# Patient Record
Sex: FEMALE | Race: WHITE | NOT HISPANIC OR LATINO | Employment: OTHER | URBAN - METROPOLITAN AREA
[De-identification: names, ages, dates, MRNs, and addresses within clinical notes are randomized per-mention and may not be internally consistent; named-entity substitution may affect disease eponyms.]

---

## 2018-08-29 ENCOUNTER — EVALUATION (OUTPATIENT)
Dept: PHYSICAL THERAPY | Facility: CLINIC | Age: 43
End: 2018-08-29
Payer: COMMERCIAL

## 2018-08-29 ENCOUNTER — TRANSCRIBE ORDERS (OUTPATIENT)
Dept: PHYSICAL THERAPY | Facility: CLINIC | Age: 43
End: 2018-08-29

## 2018-08-29 DIAGNOSIS — S83.232S COMPLEX TEAR OF MEDIAL MENISCUS OF LEFT KNEE AS CURRENT INJURY, SEQUELA: Primary | ICD-10-CM

## 2018-08-29 DIAGNOSIS — S83.232A COMPLEX TEAR OF MEDIAL MENISCUS OF LEFT KNEE AS CURRENT INJURY, INITIAL ENCOUNTER: Primary | ICD-10-CM

## 2018-08-29 PROCEDURE — 97161 PT EVAL LOW COMPLEX 20 MIN: CPT | Performed by: PHYSICAL THERAPIST

## 2018-08-29 PROCEDURE — 97014 ELECTRIC STIMULATION THERAPY: CPT | Performed by: PHYSICAL THERAPIST

## 2018-08-29 PROCEDURE — G0283 ELEC STIM OTHER THAN WOUND: HCPCS | Performed by: PHYSICAL THERAPIST

## 2018-08-29 PROCEDURE — G8979 MOBILITY GOAL STATUS: HCPCS | Performed by: PHYSICAL THERAPIST

## 2018-08-29 PROCEDURE — G8978 MOBILITY CURRENT STATUS: HCPCS | Performed by: PHYSICAL THERAPIST

## 2018-08-29 PROCEDURE — 97140 MANUAL THERAPY 1/> REGIONS: CPT | Performed by: PHYSICAL THERAPIST

## 2018-08-29 PROCEDURE — 97110 THERAPEUTIC EXERCISES: CPT | Performed by: PHYSICAL THERAPIST

## 2018-08-29 NOTE — LETTER
2018    MD Ajay Jeff [address], [facility]    Patient: Karen Walker   YOB: 1975   Date of Visit: 2018     Encounter Diagnosis     ICD-10-CM    1  Complex tear of medial meniscus of left knee as current injury, initial encounter S83 232A        Dear Dr Sylvester Median:    Please review the attached Plan of Care from [facility] [place] recent visit  Please verify that you agree therapy should continue by signing the attached document and sending it back to our office  If you have any questions or concerns, please don't hesitate to call  Sincerely,    Lizzy Krishnamurthy, PT      Referring Provider:      I certify that I have read the below Plan of Care and certify the need for these services furnished under this plan of treatment while under my care  MD Ajay Jeff [address]: [phone]          PT Evaluation     Today's date: 2018  Patient name: Karen Walker  : 1975  MRN: [id number]  Referring provider: Daphne Zayas MD  Dx:   Encounter Diagnosis     ICD-10-CM    1  Complex tear of medial meniscus of left knee as current injury, initial encounter S83 232A        Start Time: 1030  Stop Time: 1130  Total time in clinic (min): 60 minutes    Assessment  Impairments: abnormal gait, abnormal or restricted ROM, activity intolerance, impaired physical strength, lacks appropriate home exercise program, pain with function and poor body mechanics    Assessment details: Karen Walker is a 38 y o  female who presents with pain, decreased strength, decreased ROM and ambulatory dysfunction  Due to these impairments, Patient has difficulty performing a/iadls, recreational activities and engaging in social activities  Patient's clinical presentation is consistent with their referring diagnosis of left knee pain s/p surgery for medial meniscus  Patient felt better after session and moving better  Patient would benefit from skilled physical therapy to address their aforementioned impairments, improve their level of function and to improve their overall quality of life  Understanding of Dx/Px/POC: excellent  Goals  ST-3 WEEKS  1  Decrease pain by 2 points in left knee  2  Increase ROM left knee > 105 degrees  3   Increase left knee strength >4/5  4  Ambulate with good technique and safety on level surface  LT-6 WEEKS  1  Patient to be independent with a/iadls  2  Increase functional activities for leisure and home activities to previous LOF  3  Independent with HEP and/or fitness program     Plan  Patient would benefit from: skilled physical therapy  Planned modality interventions: cryotherapy, electrical stimulation/Russian stimulation, thermotherapy: hydrocollator packs and unattended electrical stimulation  Planned therapy interventions: activity modification, body mechanics training, aquatic therapy, flexibility, functional ROM exercises, home exercise program, IADL retraining, joint mobilization, manual therapy, neuromuscular re-education, patient education, postural training, strengthening, stretching, therapeutic activities, therapeutic exercise, balance/weight bearing training and gait training  Frequency: 2-3x week  Duration in weeks: 8  Treatment plan discussed with: patient        Subjective Evaluation    History of Present Illness  Date of surgery: 2018  Mechanism of injury: surgery  Mechanism of injury: Patient had mmt left knee for years, but twisted left knee 7/10/18 getting out of pool  MRI showed it got worse, and scheduled for surgery  Long leg immobilizer for 1 week  Now referred to [facility]  Patient reports her knee feels stiff    Not a recurrent problem   Pain  Current pain ratin  At best pain ratin  At worst pain ratin  Location: lleft knee  Quality: tight  Relieving factors: change in position, medications and ice  Aggravating factors: stair climbing, walking and sitting  Progression: no change    Social Support  Steps to enter house: yes  Lives in: [place] [facility]  Lives with: spouse    Employment status: working (works from home, travels )    Diagnostic Tests  MRI studies: abnormal  Patient Goals  Patient goals for therapy: decreased edema, decreased pain, increased motion, increased strength, independence with ADLs/IADLs and return to sport/leisure activities          Objective     Static Posture     Comments  No abnormalities noted with posture other than favoring left LE    Observations     Additional Observation Details  2 inferior patella scope sites, closed with no drainage  Steri strips in place    Active Range of Motion   Left Knee   Flexion: 83 degrees   Extension: -4 degrees     Right Knee   Flexion: 140 degrees   Extension: 0 degrees     Strength/Myotome Testing     Left Knee   Flexion: 3  Extension: 3+    Right Knee   Normal strength    Additional Strength Details  MMT static in available range       Swelling     Left Knee Girth Measurement (cm)   Joint line: 40 cm    Right Knee Girth Measurement (cm)   Joint line: 37 5 cm    Ambulation   Weight-Bearing Status   Weight-Bearing Status (Left): weight-bearing as tolerated   Assistive device used: none    Additional Weight-Bearing Status Details  Decreased heel strike on left, decreased stride length on left    Ambulation: Level Surfaces   Ambulation without assistive device: independent    Ambulation: Stairs   Pattern: non-reciprocal  Railings: one rail  Pattern: non-reciprocal  Railings: one rail      Flowsheet Rows      Most Recent Value   PT/OT G-Codes   Current Score  47   Projected Score  73   Assessment Type  Evaluation   G code set  Mobility: Walking & Moving Around   Mobility: Walking and Moving Around Current Status ()  CK   Mobility: Walking and Moving Around Goal Status ()  CJ        Precautions: Glaucoma    Daily Treatment Diary Modalities  8/29            Ice/ES left knee HT 10'                                        Manual  8/29            L knee 8'                                                                    Exercise Diary  8/29            L heel slide 20x            L SLR 20x            L SAQ 20x            L LAQ 20x                                      Slant board L3 30" 4x            Bike 5'            Hip  adduction 20# 30x            Hip abduction 20# 30x            Calf press 40# 30x

## 2018-08-29 NOTE — PROGRESS NOTES
PT Evaluation     Today's date: 2018  Patient name: Nataliya Duran  : 1975  MRN: [id number]  Referring provider: Herlinda Griffin MD  Dx:   Encounter Diagnosis     ICD-10-CM    1  Complex tear of medial meniscus of left knee as current injury, initial encounter S83 232A        Start Time: 1030  Stop Time: 1130  Total time in clinic (min): 60 minutes    Assessment  Impairments: abnormal gait, abnormal or restricted ROM, activity intolerance, impaired physical strength, lacks appropriate home exercise program, pain with function and poor body mechanics    Assessment details: Nataliya Duran is a 38 y o  female who presents with pain, decreased strength, decreased ROM and ambulatory dysfunction  Due to these impairments, Patient has difficulty performing a/iadls, recreational activities and engaging in social activities  Patient's clinical presentation is consistent with their referring diagnosis of left knee pain s/p surgery for medial meniscus  Patient felt better after session and moving better  Patient would benefit from skilled physical therapy to address their aforementioned impairments, improve their level of function and to improve their overall quality of life  Understanding of Dx/Px/POC: excellent  Goals  ST-3 WEEKS  1  Decrease pain by 2 points in left knee  2  Increase ROM left knee > 105 degrees  3   Increase left knee strength >4/5  4  Ambulate with good technique and safety on level surface  LT-6 WEEKS  1  Patient to be independent with a/iadls  2  Increase functional activities for leisure and home activities to previous LOF    3  Independent with HEP and/or fitness program     Plan  Patient would benefit from: skilled physical therapy  Planned modality interventions: cryotherapy, electrical stimulation/Russian stimulation, thermotherapy: hydrocollator packs and unattended electrical stimulation  Planned therapy interventions: activity modification, body mechanics training, aquatic therapy, flexibility, functional ROM exercises, home exercise program, IADL retraining, joint mobilization, manual therapy, neuromuscular re-education, patient education, postural training, strengthening, stretching, therapeutic activities, therapeutic exercise, balance/weight bearing training and gait training  Frequency: 2-3x week  Duration in weeks: 8  Treatment plan discussed with: patient        Subjective Evaluation    History of Present Illness  Date of surgery: 2018  Mechanism of injury: surgery  Mechanism of injury: Patient had mmt left knee for years, but twisted left knee 7/10/18 getting out of pool  MRI showed it got worse, and scheduled for surgery  Long leg immobilizer for 1 week  Now referred to [facility]  Patient reports her knee feels stiff  Not a recurrent problem   Pain  Current pain ratin  At best pain ratin  At worst pain ratin  Location: lleft knee  Quality: tight  Relieving factors: change in position, medications and ice  Aggravating factors: stair climbing, walking and sitting  Progression: no change    Social Support  Steps to enter house: yes  Lives in: [facility]  Lives with: spouse    Employment status: working (works from home, travels )    Diagnostic Tests  MRI studies: abnormal  Patient Goals  Patient goals for therapy: decreased edema, decreased pain, increased motion, increased strength, independence with ADLs/IADLs and return to sport/leisure activities          Objective     Static Posture     Comments  No abnormalities noted with posture other than favoring left LE    Observations     Additional Observation Details  2 inferior patella scope sites, closed with no drainage   Steri strips in place    Active Range of Motion   Left Knee   Flexion: 83 degrees   Extension: -4 degrees     Right Knee   Flexion: 140 degrees   Extension: 0 degrees     Strength/Myotome Testing     Left Knee   Flexion: 3  Extension: 3+    Right Knee   Normal strength    Additional Strength Details  MMT static in available range       Swelling     Left Knee Girth Measurement (cm)   Joint line: 40 cm    Right Knee Girth Measurement (cm)   Joint line: 37 5 cm    Ambulation   Weight-Bearing Status   Weight-Bearing Status (Left): weight-bearing as tolerated   Assistive device used: none    Additional Weight-Bearing Status Details  Decreased heel strike on left, decreased stride length on left    Ambulation: Level Surfaces   Ambulation without assistive device: independent    Ambulation: Stairs   Pattern: non-reciprocal  Railings: one rail  Pattern: non-reciprocal  Railings: one rail      Flowsheet Rows      Most Recent Value   PT/OT G-Codes   Current Score  47   Projected Score  73   Assessment Type  Evaluation   G code set  Mobility: Walking & Moving Around   Mobility: Walking and Moving Around Current Status ()  CK   Mobility: Walking and Moving Around Goal Status ()  CJ        Precautions: Glaucoma    Daily Treatment Diary     Modalities  8/29            Ice/ES left knee HT 10'                                        Manual  8/29            L knee 8'                                                                    Exercise Diary  8/29            L heel slide 20x            L SLR 20x            L SAQ 20x            L LAQ 20x                                      Slant board L3 30" 4x            Bike 5'            Hip  adduction 20# 30x            Hip abduction 20# 30x            Calf press 40# 30x

## 2018-09-04 ENCOUNTER — OFFICE VISIT (OUTPATIENT)
Dept: PHYSICAL THERAPY | Facility: CLINIC | Age: 43
End: 2018-09-04
Payer: COMMERCIAL

## 2018-09-04 DIAGNOSIS — S83.232A COMPLEX TEAR OF MEDIAL MENISCUS OF LEFT KNEE AS CURRENT INJURY, INITIAL ENCOUNTER: Primary | ICD-10-CM

## 2018-09-04 PROCEDURE — 97140 MANUAL THERAPY 1/> REGIONS: CPT

## 2018-09-04 PROCEDURE — G8978 MOBILITY CURRENT STATUS: HCPCS

## 2018-09-04 PROCEDURE — G0283 ELEC STIM OTHER THAN WOUND: HCPCS

## 2018-09-04 PROCEDURE — 97110 THERAPEUTIC EXERCISES: CPT

## 2018-09-04 PROCEDURE — 97014 ELECTRIC STIMULATION THERAPY: CPT

## 2018-09-04 PROCEDURE — G8979 MOBILITY GOAL STATUS: HCPCS

## 2018-09-04 NOTE — PROGRESS NOTES
Daily Note     Today's date: 2018  Patient name: Sandy Alonzo  : 1975  MRN: [id number]  Referring provider: Nishant Samson MD  Dx:   Encounter Diagnosis     ICD-10-CM    1  Complex tear of medial meniscus of left knee as current injury, initial encounter S83 232A        Start Time: 6408  Stop Time: 1620  Total time in clinic (min): 55 minutes    Subjective: Pt notes she had increased swelling over the weekend but notes  Overall better  Objective: See treatment diary below  Precautions: Glaucoma    Daily Treatment Diary     Modalities             Ice/ES left knee HT 10' Ice/es  10'                        Ice after TE  10             Manual             L knee 8' 8                                                                   Exercise Diary             L heel slide 20x x30           L SLR 20x x30           L SAQ 20x x30           L LAQ 20x x30                                     Slant board L3 30" 4x 30''x4           Bike 5' 5           Hip  adduction 20# 30x 20/30           Hip abduction 20# 30x 20/30           Calf press 40# 30x 40/30                                                                                                                                        Assessment: Tolerated treatment well  Pt has stiffness and tightness in L knee  Mod swelling today in L knee  Ext is limited with discomfort  Pt able to increase reps with all ex's  Pt's skin was assessed before and after modality treatment with good skin integrity noted  Pt was informed to let myself or another staff member know if the pt would become uncomfortable at any time  Pt was informed of precautions for modality given  Appropriate barriers were applied for modality treatment  Patient would benefit from continued PT      Plan: Progress treatment as tolerated

## 2018-09-07 ENCOUNTER — OFFICE VISIT (OUTPATIENT)
Dept: PHYSICAL THERAPY | Facility: CLINIC | Age: 43
End: 2018-09-07
Payer: COMMERCIAL

## 2018-09-07 DIAGNOSIS — S83.232A COMPLEX TEAR OF MEDIAL MENISCUS OF LEFT KNEE AS CURRENT INJURY, INITIAL ENCOUNTER: Primary | ICD-10-CM

## 2018-09-07 PROCEDURE — 97110 THERAPEUTIC EXERCISES: CPT

## 2018-09-07 PROCEDURE — 97014 ELECTRIC STIMULATION THERAPY: CPT

## 2018-09-07 PROCEDURE — G0283 ELEC STIM OTHER THAN WOUND: HCPCS

## 2018-09-07 PROCEDURE — 97140 MANUAL THERAPY 1/> REGIONS: CPT

## 2018-09-07 NOTE — PROGRESS NOTES
Daily Note     Today's date: 2018  Patient name: Sugar Barnett  : 1975  MRN: [id number]  Referring provider: Blank Michele MD  Dx:   Encounter Diagnosis     ICD-10-CM    1  Complex tear of medial meniscus of left knee as current injury, initial encounter [id number]        Start Time: 9769  Stop Time: 1440  Total time in clinic (min): 55 minutes    Subjective: patient reports having a hard time bending knee when on the couch  Notes getting better  Objective: See treatment diary below      Assessment: Tolerated treatment well, good ROM improving tightness and discomfort at end ranges flex/ext  Patient exhibited good technique with therapeutic exercises and would benefit from continued PT      Plan: Continue per plan of care         Precautions: Glaucoma    Daily Treatment Diary     Modalities            Ice/ES left knee HT 10' Ice/es  10' 10'                       Ice after TE  10 10'            Manual            L knee 8' 8 8                                                                  Exercise Diary            L heel slide 20x x30 30x          L SLR 20x x30 30x          L SAQ 20x x30 1#/30          L LAQ 20x x30 1#/30                                    Slant board L3 30" 4x 30''x4 30"x4          Bike 5' 5 6'          Hip  adduction 20# 30x 20/30 20/30          Hip abduction 20# 30x 20/30 20/30          Calf press 40# 30x 40/30 40/30

## 2018-10-11 NOTE — PROGRESS NOTES
Patient had 3 visits and was feeling a little better, difficulty with ROM of knee  Patient has large copay  Patient has not returned for further visits  Patient did not return follow up phone call  D/c PT at this time

## 2018-11-26 ENCOUNTER — TRANSCRIBE ORDERS (OUTPATIENT)
Dept: ADMINISTRATIVE | Facility: HOSPITAL | Age: 43
End: 2018-11-26

## 2018-11-26 DIAGNOSIS — G35 MS (MULTIPLE SCLEROSIS) (HCC): Primary | ICD-10-CM

## 2018-12-12 ENCOUNTER — HOSPITAL ENCOUNTER (OUTPATIENT)
Dept: MRI IMAGING | Facility: HOSPITAL | Age: 43
Discharge: HOME | End: 2018-12-12
Attending: PSYCHIATRY & NEUROLOGY
Payer: COMMERCIAL

## 2018-12-12 DIAGNOSIS — G35 MS (MULTIPLE SCLEROSIS) (HCC): ICD-10-CM

## 2018-12-12 PROCEDURE — A9585 GADOBUTROL INJECTION: HCPCS | Performed by: PSYCHIATRY & NEUROLOGY

## 2018-12-12 PROCEDURE — 70553 MRI BRAIN STEM W/O & W/DYE: CPT

## 2018-12-12 RX ADMIN — GADOBUTROL 6 ML: 604.72 INJECTION INTRAVENOUS at 15:59

## 2022-01-24 ENCOUNTER — ESTABLISHED COMPREHENSIVE EXAM (OUTPATIENT)
Dept: URBAN - METROPOLITAN AREA CLINIC 6 | Facility: CLINIC | Age: 47
End: 2022-01-24

## 2022-01-24 DIAGNOSIS — H40.1132: ICD-10-CM

## 2022-01-24 PROCEDURE — 92012 INTRM OPH EXAM EST PATIENT: CPT

## 2022-01-24 PROCEDURE — 92020 GONIOSCOPY: CPT

## 2022-01-24 PROCEDURE — 92083 EXTENDED VISUAL FIELD XM: CPT

## 2022-01-24 ASSESSMENT — TONOMETRY
OS_IOP_MMHG: 23
OD_IOP_MMHG: 21

## 2022-01-24 ASSESSMENT — VISUAL ACUITY
OS_CC: 20/20-1
OD_CC: 20/20-1

## 2022-07-26 ENCOUNTER — 6 MONTH FOLLOW UP (OUTPATIENT)
Dept: URBAN - METROPOLITAN AREA CLINIC 6 | Facility: CLINIC | Age: 47
End: 2022-07-26

## 2022-07-26 DIAGNOSIS — H40.1132: ICD-10-CM

## 2022-07-26 PROCEDURE — 92133 CPTRZD OPH DX IMG PST SGM ON: CPT

## 2022-07-26 PROCEDURE — 92014 COMPRE OPH EXAM EST PT 1/>: CPT

## 2022-07-26 ASSESSMENT — TONOMETRY
OS_IOP_MMHG: 24
OD_IOP_MMHG: 23

## 2022-07-26 ASSESSMENT — VISUAL ACUITY
OS_CC: 20/20+1
OD_CC: 20/20+1

## 2023-02-06 ENCOUNTER — IOP CHECK (OUTPATIENT)
Dept: URBAN - METROPOLITAN AREA CLINIC 6 | Facility: CLINIC | Age: 48
End: 2023-02-06

## 2023-02-06 DIAGNOSIS — H40.1132: ICD-10-CM

## 2023-02-06 PROCEDURE — 92012 INTRM OPH EXAM EST PATIENT: CPT

## 2023-02-06 PROCEDURE — 92020 GONIOSCOPY: CPT

## 2023-02-06 ASSESSMENT — VISUAL ACUITY
OS_CC: 20/25+1
OD_CC: 20/25+1

## 2023-02-06 ASSESSMENT — TONOMETRY
OD_IOP_MMHG: 26
OS_IOP_MMHG: 36
OS_IOP_MMHG: 44

## 2023-04-10 ENCOUNTER — IOP CHECK (OUTPATIENT)
Dept: URBAN - METROPOLITAN AREA CLINIC 6 | Facility: CLINIC | Age: 48
End: 2023-04-10

## 2023-04-10 DIAGNOSIS — H40.1132: ICD-10-CM

## 2023-04-10 DIAGNOSIS — H40.053: ICD-10-CM

## 2023-04-10 PROCEDURE — 92083 EXTENDED VISUAL FIELD XM: CPT

## 2023-04-10 PROCEDURE — 92012 INTRM OPH EXAM EST PATIENT: CPT

## 2023-04-10 ASSESSMENT — VISUAL ACUITY
OS_SC: 20/50
OD_SC: 20/50-1

## 2023-04-10 ASSESSMENT — TONOMETRY
OS_IOP_MMHG: 34
OD_IOP_MMHG: 20

## 2023-06-12 ENCOUNTER — IOP CHECK (OUTPATIENT)
Dept: URBAN - METROPOLITAN AREA CLINIC 6 | Facility: CLINIC | Age: 48
End: 2023-06-12

## 2023-06-12 DIAGNOSIS — H40.053: ICD-10-CM

## 2023-06-12 DIAGNOSIS — H40.1132: ICD-10-CM

## 2023-06-12 PROCEDURE — 92012 INTRM OPH EXAM EST PATIENT: CPT

## 2023-06-12 ASSESSMENT — VISUAL ACUITY
OS_CC: 20/20
OD_CC: 20/25-1

## 2023-06-12 ASSESSMENT — TONOMETRY
OS_IOP_MMHG: 19
OD_IOP_MMHG: 19

## 2023-10-04 ENCOUNTER — FOLLOW UP (OUTPATIENT)
Dept: URBAN - METROPOLITAN AREA CLINIC 6 | Facility: CLINIC | Age: 48
End: 2023-10-04

## 2023-10-04 DIAGNOSIS — H40.1132: ICD-10-CM

## 2023-10-04 DIAGNOSIS — H40.053: ICD-10-CM

## 2023-10-04 PROCEDURE — 92133 CPTRZD OPH DX IMG PST SGM ON: CPT

## 2023-10-04 PROCEDURE — 92014 COMPRE OPH EXAM EST PT 1/>: CPT

## 2023-10-04 PROCEDURE — 92202 OPSCPY EXTND ON/MAC DRAW: CPT

## 2023-10-04 ASSESSMENT — VISUAL ACUITY
OD_CC: 20/25
OS_CC: 20/25-1

## 2023-10-04 ASSESSMENT — TONOMETRY
OD_IOP_MMHG: 19
OS_IOP_MMHG: 17

## 2024-04-15 ENCOUNTER — IOP CHECK (OUTPATIENT)
Dept: URBAN - METROPOLITAN AREA CLINIC 6 | Facility: CLINIC | Age: 49
End: 2024-04-15

## 2024-04-15 DIAGNOSIS — H40.053: ICD-10-CM

## 2024-04-15 DIAGNOSIS — H40.1132: ICD-10-CM

## 2024-04-15 PROCEDURE — 92012 INTRM OPH EXAM EST PATIENT: CPT

## 2024-04-15 PROCEDURE — 92083 EXTENDED VISUAL FIELD XM: CPT

## 2024-04-15 PROCEDURE — 92020 GONIOSCOPY: CPT

## 2024-04-15 PROCEDURE — 92133 CPTRZD OPH DX IMG PST SGM ON: CPT | Mod: NC

## 2024-04-15 ASSESSMENT — TONOMETRY
OD_IOP_MMHG: 22
OS_IOP_MMHG: 17

## 2024-04-15 ASSESSMENT — VISUAL ACUITY
OS_CC: 20/20
OD_CC: 20/20

## (undated) RX ORDER — BRIMONIDINE TARTRATE, TIMOLOL MALEATE 2; 5 MG/ML; MG/ML: 1 SOLUTION/ DROPS OPHTHALMIC TWICE A DAY